# Patient Record
Sex: FEMALE | Race: WHITE | NOT HISPANIC OR LATINO | Employment: OTHER | URBAN - METROPOLITAN AREA
[De-identification: names, ages, dates, MRNs, and addresses within clinical notes are randomized per-mention and may not be internally consistent; named-entity substitution may affect disease eponyms.]

---

## 2017-01-05 ENCOUNTER — HOSPITAL ENCOUNTER (OUTPATIENT)
Dept: RADIOLOGY | Facility: HOSPITAL | Age: 57
Discharge: HOME | End: 2017-01-05
Attending: ORTHOPAEDIC SURGERY
Payer: MEDICARE

## 2017-01-05 ENCOUNTER — TELEPHONE (OUTPATIENT)
Dept: FAMILY MEDICINE | Facility: CLINIC | Age: 57
End: 2017-01-05

## 2017-01-05 ENCOUNTER — OFFICE VISIT (OUTPATIENT)
Dept: FAMILY MEDICINE | Facility: CLINIC | Age: 57
End: 2017-01-05
Payer: MEDICARE

## 2017-01-05 ENCOUNTER — OFFICE VISIT (OUTPATIENT)
Dept: ORTHOPEDICS | Facility: CLINIC | Age: 57
End: 2017-01-05
Payer: MEDICARE

## 2017-01-05 VITALS — HEIGHT: 66 IN | BODY MASS INDEX: 47.09 KG/M2 | WEIGHT: 293 LBS

## 2017-01-05 VITALS
SYSTOLIC BLOOD PRESSURE: 148 MMHG | DIASTOLIC BLOOD PRESSURE: 94 MMHG | HEART RATE: 48 BPM | BODY MASS INDEX: 50.04 KG/M2 | HEIGHT: 66 IN

## 2017-01-05 DIAGNOSIS — M19.90 ARTHRITIS: ICD-10-CM

## 2017-01-05 DIAGNOSIS — K63.5 COLON POLYP: ICD-10-CM

## 2017-01-05 DIAGNOSIS — I10 ESSENTIAL HYPERTENSION: ICD-10-CM

## 2017-01-05 DIAGNOSIS — M25.552 LEFT HIP PAIN: ICD-10-CM

## 2017-01-05 DIAGNOSIS — I48.20 CHRONIC ATRIAL FIBRILLATION: Primary | ICD-10-CM

## 2017-01-05 DIAGNOSIS — M25.552 LEFT HIP PAIN: Primary | ICD-10-CM

## 2017-01-05 PROCEDURE — 99203 OFFICE O/P NEW LOW 30 MIN: CPT | Mod: S$GLB,,, | Performed by: ORTHOPAEDIC SURGERY

## 2017-01-05 PROCEDURE — 3077F SYST BP >= 140 MM HG: CPT | Mod: S$GLB,,, | Performed by: FAMILY MEDICINE

## 2017-01-05 PROCEDURE — 99499 UNLISTED E&M SERVICE: CPT | Mod: S$GLB,,, | Performed by: ORTHOPAEDIC SURGERY

## 2017-01-05 PROCEDURE — 99999 PR PBB SHADOW E&M-EST. PATIENT-LVL II: CPT | Mod: PBBFAC,,, | Performed by: ORTHOPAEDIC SURGERY

## 2017-01-05 PROCEDURE — 1159F MED LIST DOCD IN RCRD: CPT | Mod: S$GLB,,, | Performed by: FAMILY MEDICINE

## 2017-01-05 PROCEDURE — 3080F DIAST BP >= 90 MM HG: CPT | Mod: S$GLB,,, | Performed by: FAMILY MEDICINE

## 2017-01-05 PROCEDURE — 73502 X-RAY EXAM HIP UNI 2-3 VIEWS: CPT | Mod: 26,LT,, | Performed by: RADIOLOGY

## 2017-01-05 PROCEDURE — 99204 OFFICE O/P NEW MOD 45 MIN: CPT | Mod: S$GLB,,, | Performed by: FAMILY MEDICINE

## 2017-01-05 PROCEDURE — 1159F MED LIST DOCD IN RCRD: CPT | Mod: S$GLB,,, | Performed by: ORTHOPAEDIC SURGERY

## 2017-01-05 PROCEDURE — 99999 PR PBB SHADOW E&M-NEW PATIENT-LVL IV: CPT | Mod: PBBFAC,,, | Performed by: FAMILY MEDICINE

## 2017-01-05 RX ORDER — ACETAMINOPHEN 325 MG/1
650 TABLET ORAL DAILY PRN
COMMUNITY

## 2017-01-05 RX ORDER — FERROUS SULFATE 325(65) MG
325 TABLET ORAL DAILY
COMMUNITY

## 2017-01-05 RX ORDER — RABEPRAZOLE SODIUM 20 MG/1
TABLET, DELAYED RELEASE ORAL
COMMUNITY
Start: 2016-10-03 | End: 2017-01-05 | Stop reason: SDUPTHER

## 2017-01-05 RX ORDER — CITALOPRAM 40 MG/1
40 TABLET, FILM COATED ORAL NIGHTLY
COMMUNITY

## 2017-01-05 RX ORDER — TRAMADOL HYDROCHLORIDE 50 MG/1
50 TABLET ORAL
COMMUNITY
Start: 2016-07-01 | End: 2017-01-05

## 2017-01-05 RX ORDER — CELECOXIB 400 MG/1
400 CAPSULE ORAL DAILY
Status: ON HOLD | COMMUNITY
End: 2018-04-14 | Stop reason: HOSPADM

## 2017-01-05 RX ORDER — RABEPRAZOLE SODIUM 20 MG/1
20 TABLET, DELAYED RELEASE ORAL DAILY
Qty: 90 TABLET | Refills: 2 | Status: SHIPPED | OUTPATIENT
Start: 2017-01-05

## 2017-01-05 RX ORDER — METOPROLOL TARTRATE 25 MG/1
12.5 TABLET, FILM COATED ORAL 2 TIMES DAILY
COMMUNITY
Start: 2016-10-03

## 2017-01-05 RX ORDER — OMEPRAZOLE 20 MG/1
20 CAPSULE, DELAYED RELEASE ORAL DAILY PRN
Status: ON HOLD | COMMUNITY
End: 2018-04-12

## 2017-01-05 RX ORDER — LISINOPRIL 20 MG/1
TABLET ORAL
COMMUNITY
Start: 2016-07-21

## 2017-01-05 RX ORDER — PROPAFENONE HYDROCHLORIDE 150 MG/1
300 TABLET, COATED ORAL 2 TIMES DAILY
COMMUNITY

## 2017-01-05 NOTE — PROGRESS NOTES
Trice Damon, 56 years old, has had debilitating left-sided hip pain for   couple years' time, got progressively worse, comes in today in a wheelchair,   8/10 on the pain scale, aching pain, worse with activity and relieved with rest.    Points to the groin as the location of her pain.  She also has pain in the   lower back radiating down the leg.  No precipitating event.    Exam today shows that hip range of motion is exquisitely uncomfortable for and   reproduces symptoms.  She also has positive straight leg raise testing.    X-rays of the hip show degenerative changes in the hip, degenerative changes   noted in the lumbar spine as well.    Degenerative disease in the hip, degenerative disease in the lumbar spine.    PLAN:  We will schedule for a diagnostic/therapeutic injection into the left hip   intraarticularly with the assistance of fluoroscopy.  We discussed with her hip   replacement surgery.  We will give her literature on this.  We will see her   back as needed.      JAZMIN/BERE  dd: 01/05/2017 14:53:33 (CST)  td: 01/06/2017 07:52:07 (CST)  Doc ID   #[id number]  Job ID #[id number]    CC:     Further History  Aching pain  Worse with activity  Relieved with rest  No other associated symptoms  No other radiation    Further Exam  Alert and oriented  Pleasant  Contralateral limb has appropriate range of motion for age and condition  Contralateral limb has appropriate strength for age and condition  Contralateral limb has appropriate stability  for age and condition  No adenopathy  Pulses are appropriate for current condition  Skin is intact        Chief Complaint    Chief Complaint   Patient presents with    Left Hip - Pain       HPI  Trice Damon is a 56 y.o.  female who presents with       Past Medical History  Past Medical History   Diagnosis Date    A-fib        Past Surgical History  Past Surgical History   Procedure Laterality Date    Gastric bypass         Medications  Current Outpatient Prescriptions    Medication Sig    lisinopril 10 MG tablet TAKE ONE TABLET BY MOUTH ONCE DAILY    metoprolol tartrate (LOPRESSOR) 25 MG tablet     acetaminophen (TYLENOL) 325 MG tablet Take 650 mg by mouth.    celecoxib (CELEBREX) 400 MG capsule Take 400 mg by mouth.    citalopram (CELEXA) 40 MG tablet Take 40 mg by mouth.    ferrous sulfate 325 mg (65 mg iron) Tab tablet Take 325 mg by mouth.    omeprazole (PRILOSEC) 20 MG capsule Take 20 mg by mouth.    propafenone (RHTHYMOL) 150 MG Tab Take 150 mg by mouth.    rabeprazole (ACIPHEX) 20 mg tablet Take 1 tablet (20 mg total) by mouth once daily.     No current facility-administered medications for this visit.        Allergies  Review of patient's allergies indicates:   Allergen Reactions    Codeine sulfate      Itchy (skin)^       Family History  History reviewed. No pertinent family history.    Social History  Social History     Social History    Marital status:      Spouse name: N/A    Number of children: N/A    Years of education: N/A     Occupational History    Not on file.     Social History Main Topics    Smoking status: Never Smoker    Smokeless tobacco: Not on file    Alcohol use Not on file    Drug use: Not on file    Sexual activity: Not on file     Other Topics Concern    Not on file     Social History Narrative               Review of Systems     Constitutional: Negative    HENT: Negative  Eyes: Negative  Respiratory: Negative  Cardiovascular: Negative  Musculoskeletal: HPI  Skin: Negative  Neurological: Negative  Hematological: Negative  Endocrine: Negative                 Physical Exam    There were no vitals filed for this visit.  Body mass index is 50.04 kg/(m^2).  Physical Examination:     General appearance -  well appearing, and in no distress  Mental status - awake  Neck - supple  Chest -  symmetric air entry  Heart - normal rate   Abdomen - soft      Assessment     1. Left hip pain          Plan

## 2017-01-05 NOTE — MR AVS SNAPSHOT
[facility]  [address]  [place] [address]  Phone: [phone]  Fax: [phone]                  Trice Damon   2017 3:00 PM   Office Visit    Description:  Female : 1960   Provider:  Saul Jackson MD   Department:  [facility]           Reason for Visit     Establish Care           Diagnoses this Visit        Comments    Chronic atrial fibrillation    -  Primary     Colon polyp         Arthritis                To Do List           Goals (5 Years of Data)     None       These Medications        Disp Refills Start End    rabeprazole (ACIPHEX) 20 mg tablet 90 tablet 2 2017     Take 1 tablet (20 mg total) by mouth once daily. - Oral    [address] [address]  Ph #: [phone]         [facility] On Call     [facility] On Call Nurse Care Line -  Assistance  Registered nurses in the [facility] On Call Center provide clinical advisement, health education, appointment booking, and other advisory services.  Call for this free service at [phone].             Medications           Message regarding Medications     Verify the changes and/or additions to your medication regime listed below are the same as discussed with your clinician today.  If any of these changes or additions are incorrect, please notify your healthcare provider.        CHANGE how you are taking these medications     Start Taking Instead of    rabeprazole (ACIPHEX) 20 mg tablet rabeprazole (ACIPHEX) 20 mg tablet    Dosage:  Take 1 tablet (20 mg total) by mouth once daily.     Reason for Change:  Reorder       STOP taking these medications     tramadol (ULTRAM) 50 mg tablet Take 50 mg by mouth.           Verify that the below list of medications is an accurate representation of the medications you are currently taking.  If none reported, the list may be blank. If incorrect, please contact your healthcare provider.  "Carry this list with you in case of emergency.           Current Medications     acetaminophen (TYLENOL) 325 MG tablet Take 650 mg by mouth.    celecoxib (CELEBREX) 400 MG capsule Take 400 mg by mouth.    citalopram (CELEXA) 40 MG tablet Take 40 mg by mouth.    ferrous sulfate 325 mg (65 mg iron) Tab tablet Take 325 mg by mouth.    lisinopril 10 MG tablet TAKE ONE TABLET BY MOUTH ONCE DAILY    metoprolol tartrate (LOPRESSOR) 25 MG tablet     omeprazole (PRILOSEC) 20 MG capsule Take 20 mg by mouth.    propafenone (RHTHYMOL) 150 MG Tab Take 150 mg by mouth.    rabeprazole (ACIPHEX) 20 mg tablet Take 1 tablet (20 mg total) by mouth once daily.           Clinical Reference Information           Vital Signs - Last Recorded  Most recent update: 1/5/2017  3:34 PM by Camila Freire LPN    BP Pulse Ht BMI       (!) 148/94 (!) 48 5' 6" (1.676 m) 50.04 kg/m2       Blood Pressure          Most Recent Value    BP  (!)  148/94      Allergies as of 1/5/2017     Codeine Sulfate      Immunizations Administered on Date of Encounter - 1/5/2017     None      Orders Placed During Today's Visit      Normal Orders This Visit    Ambulatory referral to Cardiology     Ambulatory referral to Gastroenterology     Ambulatory referral to Rheumatology       [facility] Sign-Up     Activating your [facility] account is as easy as 1-2-3!     1) Visit [email or web address], select Sign Up Now, enter this activation code and your date of birth, then select Next.  [email or web address]  Expires: 1/13/2017 12:35 PM      2) Create a username and password to use when you visit [facility] in the future and select a security question in case you lose your password and select Next.    3) Enter your e-mail address and click Sign Up!    Additional Information  If you have questions, please e-mail [email or web address] or call [phone] to talk to our [facility] staff. Remember, [facility] is NOT to be used for urgent needs. For medical emergencies, dial 911.         "

## 2017-01-05 NOTE — TELEPHONE ENCOUNTER
Dr. Scott's first available is in March. Dr. Haile is June. Pt is scheduled for 3/14/17 with Dr. Scott and placed on wait list. Thank you for the referral. Pt notified.

## 2017-01-05 NOTE — PROGRESS NOTES
Patient, Trice Damon (MRN #[id number]), presented with a recorded BMI of 50.04 kg/m^2 consistent with the definition of morbid obesity (ICD-10 E66.01). The patient's morbid obesity was monitored, evaluated, addressed and/or treated. This addendum to the medical record is made on 01/05/2017.

## 2017-01-09 ENCOUNTER — TELEPHONE (OUTPATIENT)
Dept: ORTHOPEDICS | Facility: CLINIC | Age: 57
End: 2017-01-09

## 2017-01-09 NOTE — TELEPHONE ENCOUNTER
----- Message from Tamra Hyatt sent at 1/9/2017 10:21 AM CST -----  Contact: Self  Calling regarding having physical therapy order faxed to [facility]. They did not go through last week.  Please re-fax.

## 2017-01-15 NOTE — PROGRESS NOTES
HISTORY OF PRESENT ILLNESS:  A pleasant female here today.  She is a new patient   to me.  [place] has [place].  She has got a   long history of osteoarthritis of the left hip, sees Dr. Herring in Orthopedics.    She has recently been having a low pulse and we discussed decreasing her   Lopressor to a half of a tablet twice daily, which would give her 12.5 mg twice   a day.  Otherwise, doing well.  She would like to see Rheumatology,   Gastroenterology, and Cardiology for various issues in her medical history.    SOCIAL HISTORY:  She is a nonsmoker.    PAST SURGICAL HISTORY:  She has had gastric bypass.    PAST MEDICAL HISTORY:  She has got a history of atrial fib and osteoarthritis.    MEDICATIONS:  Reviewed.    PHYSICAL EXAMINATION:  VITAL SIGNS:  Blood pressure slightly elevated.  Pulse 48.  Good capillary   refill.  BMI 50.  HEENT:  Ear canals, nasal passage, and oropharynx unremarkable.  NECK:  No neck masses or thyromegaly.  HEART:  No carotid bruits, S3 or murmur.  SKIN:  No acute skin rash.  EXTREMITIES:  She did have pain with range of motion of the left hip.    ASSESSMENT:  Chronic atrial fibrillation, arthritis, history of colon polyps.    She has hypertension.    PLAN:  The plan will be for decrease Lopressor to half tablet twice daily.    Continue follow up with Orthopedics.  We will set her up with Rheumatology,   Gastroenterology, and Cardiology at her request.  We will see her back in   followup as scheduled.      JUICE/BERE  dd: 01/15/2017 13:21:42 (CST)  td: 01/15/2017 18:23:56 (CST)  Doc ID   #[id number]  Job ID #[id number]    CC:

## 2017-04-04 ENCOUNTER — TELEPHONE (OUTPATIENT)
Dept: FAMILY MEDICINE | Facility: CLINIC | Age: 57
End: 2017-04-04

## 2017-04-04 NOTE — TELEPHONE ENCOUNTER
----- Message from Lewis Byrnes sent at 4/4/2017  9:45 AM CDT -----  Contact: [facility],Bridgette anaya wants to speak with a nurse regarding scheduling appointment before April 17 please call back at [phone]

## 2017-09-29 DIAGNOSIS — Z12.11 COLON CANCER SCREENING: ICD-10-CM

## 2018-04-12 PROBLEM — R42 DIZZINESS: Status: ACTIVE | Noted: 2018-04-12

## 2018-04-12 PROBLEM — E03.9 HYPOTHYROIDISM: Status: ACTIVE | Noted: 2018-04-12

## 2018-04-12 PROBLEM — I48.91 A-FIB: Status: ACTIVE | Noted: 2018-04-12

## 2018-04-12 PROBLEM — L08.9 SOFT TISSUE INFECTION: Status: ACTIVE | Noted: 2018-04-12

## 2018-04-12 PROBLEM — L02.416 ABSCESS OF HIP, LEFT: Status: ACTIVE | Noted: 2018-04-12

## 2018-04-12 PROBLEM — M86.9 OSTEOMYELITIS HIP: Status: ACTIVE | Noted: 2018-04-12

## 2018-04-12 PROBLEM — I10 HTN (HYPERTENSION): Status: ACTIVE | Noted: 2018-04-12

## 2018-04-12 PROBLEM — R06.00 DYSPNEA: Status: ACTIVE | Noted: 2018-04-12

## 2018-04-13 PROBLEM — E66.9 OBESITY: Status: ACTIVE | Noted: 2018-04-13

## 2018-05-25 ENCOUNTER — TELEPHONE (OUTPATIENT)
Dept: ADMINISTRATIVE | Facility: CLINIC | Age: 58
End: 2018-05-25

## 2018-05-25 NOTE — PROGRESS NOTES
Home Health SOC with [facility] per Dr. Jackson.SN services provided.

## 2019-11-13 LAB — HEMOCCULT STL QL IA: NEGATIVE

## 2019-12-10 ENCOUNTER — PATIENT OUTREACH (OUTPATIENT)
Dept: ADMINISTRATIVE | Facility: HOSPITAL | Age: 59
End: 2019-12-10

## 2019-12-26 ENCOUNTER — TELEPHONE (OUTPATIENT)
Dept: RHEUMATOLOGY | Facility: CLINIC | Age: 59
End: 2019-12-26

## 2019-12-26 NOTE — TELEPHONE ENCOUNTER
----- Message from Tamra Teran sent at 12/26/2019  4:11 PM CST -----  Contact: Patient  Type:  Patient Returning Call    Who Called:  Patient  Who Left Message for Patient:  Not sure, office  Does the patient know what this is regarding?:  Maybe appt?  Best Call Back Number:   [phone]  Additional Information:

## 2019-12-30 ENCOUNTER — TELEPHONE (OUTPATIENT)
Dept: RHEUMATOLOGY | Facility: CLINIC | Age: 59
End: 2019-12-30

## 2019-12-30 NOTE — TELEPHONE ENCOUNTER
----- Message from Estela Littlejohn sent at 12/30/2019  4:44 PM CST -----  Contact: patient  Type:  Patient Returning Call    Who Called:  patient  Who Left Message for Patient:  Elissa  Does the patient know what this is regarding?:    Best Call Back Number:  [phone]  Additional Information:

## 2019-12-31 ENCOUNTER — TELEPHONE (OUTPATIENT)
Dept: RHEUMATOLOGY | Facility: CLINIC | Age: 59
End: 2019-12-31

## 2020-03-24 ENCOUNTER — NURSE TRIAGE (OUTPATIENT)
Dept: ADMINISTRATIVE | Facility: CLINIC | Age: 60
End: 2020-03-24

## 2020-03-24 ENCOUNTER — OFFICE VISIT (OUTPATIENT)
Dept: URGENT CARE | Facility: CLINIC | Age: 60
End: 2020-03-24
Payer: MEDICARE

## 2020-03-24 VITALS — HEART RATE: 55 BPM | HEIGHT: 66 IN | OXYGEN SATURATION: 99 % | BODY MASS INDEX: 47.09 KG/M2 | WEIGHT: 293 LBS

## 2020-03-24 DIAGNOSIS — J20.9 ACUTE BRONCHITIS, UNSPECIFIED ORGANISM: ICD-10-CM

## 2020-03-24 DIAGNOSIS — R61 NIGHT SWEATS: ICD-10-CM

## 2020-03-24 DIAGNOSIS — R06.2 WHEEZING: Primary | ICD-10-CM

## 2020-03-24 DIAGNOSIS — J40 BRONCHITIS: ICD-10-CM

## 2020-03-24 LAB
CTP QC/QA: YES
FLUAV AG NPH QL: NEGATIVE
FLUBV AG NPH QL: NEGATIVE

## 2020-03-24 PROCEDURE — 99214 OFFICE O/P EST MOD 30 MIN: CPT | Mod: 25,S$GLB,, | Performed by: NURSE PRACTITIONER

## 2020-03-24 PROCEDURE — 87804 POCT INFLUENZA A/B: ICD-10-PCS | Mod: QW,S$GLB,, | Performed by: NURSE PRACTITIONER

## 2020-03-24 PROCEDURE — 87804 INFLUENZA ASSAY W/OPTIC: CPT | Mod: QW,S$GLB,, | Performed by: NURSE PRACTITIONER

## 2020-03-24 PROCEDURE — U0002 COVID-19 LAB TEST NON-CDC: HCPCS | Mod: HCNC

## 2020-03-24 PROCEDURE — 99214 PR OFFICE/OUTPT VISIT, EST, LEVL IV, 30-39 MIN: ICD-10-PCS | Mod: 25,S$GLB,, | Performed by: NURSE PRACTITIONER

## 2020-03-24 NOTE — PATIENT INSTRUCTIONS
If trouble breathing, go to the ER   Continue doxycyline  Start albuterol every 4-6 hours for wheezing/short of breath.      Self quarantine until test results back and follow cdc guidelines    Instructions for Patients Awaiting COVID-19 Test Results    Test results should be available within 7 days.    You can contact your care team via the patient portal or the regular phone number to check on testing status.    Your results will be made available to your provider and to our Infection Control Team. As soon as results are available, we will contact you. We will not be releasing results to the portal until your provider reviews them.     Please continue infection control precautions like covering your mouth when coughing, washing hands frequently and minimizing contact with others whenever possible. Current CDC recommendations do not require quarantine if you are feeling OK and haven't had fever in 24 hours. However, we recommend that you stay home while you are awaiting test results, if possible. Consider wearing a mask if you need to go out in public, until result is known.       Bronchitis with Wheezing (Viral or Bacterial: Adult)    Bronchitis is an infection of the air passages. It often occurs during a cold and is usually caused by a virus. Symptoms include cough with mucus (phlegm) and low-grade fever. This illness is contagious during the first few days and is spread through the air by coughing and sneezing, or by direct contact (touching the sick person and then touching your own eyes, nose, or mouth).  If there is a lot of inflammation, air flow is restricted. The air passages may also go into spasm, especially if you have asthma. This causes wheezing and difficulty breathing even in people who do not have asthma.  Bronchitis usually lasts 7 to 14 days. The wheezing should improve with treatment during the first week. An inhaler is often prescribed to relax the air passages and stop wheezing. Antibiotics  will be prescribed if your doctor thinks there is also a secondary bacterial infection.  Home care  · If symptoms are severe, rest at home for the first 2 to 3 days. When you go back to your usual activities, don't let yourself get too tired.  · Do not smoke. Also avoid being exposed to secondhand smoke.  · You may use over-the-counter medicine to control fever or pain, unless another medicine was prescribed. Note: If you have chronic liver or kidney disease or have ever had a stomach ulcer or gastrointestinal bleeding, talk with your healthcare provider before using these medicines. Also talk to your provider if you are taking medicine to prevent blood clots.) Aspirin should never be given to anyone younger than 18 years of age who is ill with a viral infection or fever. It may cause severe liver or brain damage.  · Your appetite may be poor, so a light diet is fine. Avoid dehydration by drinking 6 to 8 glasses of fluids per day (such as water, soft drinks, sports drinks, juices, tea, or soup). Extra fluids will help loosen secretions in the nose and lungs.  · Over-the-counter cough, cold, and sore-throat medicines will not shorten the length of the illness, but they may be helpful to reduce symptoms. (Note: Do not use decongestants if you have high blood pressure.)  · If you were given an inhaler, use it exactly as directed. If you need to use it more often than prescribed, your condition may be worsening. If this happens, contact your healthcare provider.  · If prescribed, finish all antibiotic medicine, even if you are feeling better after only a few days.  Follow-up care  Follow up with your healthcare provider, or as advised. If you had an X-ray or ECG (electrocardiogram), a specialist will review it. You will be notified of any new findings that may affect your care.  Note: If you are age 65 or older, or if you have a chronic lung disease or condition that affects your immune system, or you smoke, talk to  your healthcare provider about having a pneumococcal vaccinations and a yearly influenza vaccination (flu shot).  When to seek medical advice  Call your healthcare provider right away if any of these occur:  · Fever of 100.4°F (38°C) or higher  · Coughing up increasing amounts of colored sputum  · Weakness, drowsiness, headache, facial pain, ear pain, or a stiff neck  Call 911, or get immediate medical care  Contact emergency services right away if any of these occur.  · Coughing up blood  · Worsening weakness, drowsiness, headache, or stiff neck  · Increased wheezing not helped with medication, shortness of breath, or pain with breathing  Date Last Reviewed: 9/13/2015  © 9114-2840 [employer]. [address] [place] [address], [place] [address]. All rights reserved. This information is not intended as a substitute for professional medical care. Always follow your healthcare professional's instructions.

## 2020-03-24 NOTE — PROGRESS NOTES
"Subjective:       Patient ID: Trice Damon is a 60 y.o. female.    Vitals:  height is 5' 6" (1.676 m) and weight is 136.1 kg (300 lb). Her pulse is 55 (abnormal). Her oxygen saturation is 99%.     Chief Complaint: Wheezing and Cough    smh abif, htn, dyspnea/hx of pneumonia- last hospitalization was approx 20 years ago for pneumonia. Meds: lisinopril, lopressor, xarelta, hctz., allopurinol. No known exposure to covid 19. Not a healthcare worker.  Reports cough/wheezing, fever around 101F for a 2-3 weeks- started vasile gras night- feb 25 (one month)- started on doxycycline - currently on day 8/14. Dr. tillman (cardiologist) . Currently sweats/chills at night, sob, chest tightnes, subjective low . rx albuterol per dr. tillman- patient has not started yet.       Constitution: Positive for sweating, fatigue, fever and generalized weakness. Negative for chills.   HENT: Positive for congestion, sinus pain, sinus pressure and sore throat. Negative for voice change.    Neck: Positive for neck pain and neck stiffness. Negative for painful lymph nodes.   Eyes: Positive for eye discharge. Negative for eye redness.   Respiratory: Positive for chest tightness, cough, sputum production, shortness of breath and wheezing. Negative for bloody sputum, COPD, stridor and asthma.    Gastrointestinal: Positive for nausea and vomiting.   Musculoskeletal: Positive for muscle ache.   Skin: Negative for rash.   Allergic/Immunologic: Positive for seasonal allergies. Negative for asthma.   Hematologic/Lymphatic: Negative for swollen lymph nodes.       Objective:      Physical Exam    Limited physical exam using tytocare due to COVID emergency  Appears sick, cough with deep breath/speaking.   Assessment:       1. Wheezing    2. Bronchitis    3. Night sweats        Plan:     reviewed poct flu testing  ER precautions given  Self quarantine and follow cdc guidelines .    referred for covid 19 testing per patient's cardiologist, Dr. Tillman- per " telephone encounter notes on 3/24/2020.   Hx of bronchitis/pneumonia, afib, cough/sob, subjective fever.    Wheezing  -     POCT Influenza A/B    Bronchitis  -     POCT Influenza A/B    Night sweats  -     POCT Influenza A/B      Patient Instructions   If trouble breathing, go to the ER   Continue doxycyline  Start albuterol every 4-6 hours for wheezing/short of breath.      Self quarantine until test results back and follow cdc guidelines    Instructions for Patients Awaiting COVID-19 Test Results    Test results should be available within 7 days.    You can contact your care team via the patient portal or the regular phone number to check on testing status.    Your results will be made available to your provider and to our Infection Control Team. As soon as results are available, we will contact you. We will not be releasing results to the portal until your provider reviews them.     Please continue infection control precautions like covering your mouth when coughing, washing hands frequently and minimizing contact with others whenever possible. Current CDC recommendations do not require quarantine if you are feeling OK and haven't had fever in 24 hours. However, we recommend that you stay home while you are awaiting test results, if possible. Consider wearing a mask if you need to go out in public, until result is known.       Bronchitis with Wheezing (Viral or Bacterial: Adult)    Bronchitis is an infection of the air passages. It often occurs during a cold and is usually caused by a virus. Symptoms include cough with mucus (phlegm) and low-grade fever. This illness is contagious during the first few days and is spread through the air by coughing and sneezing, or by direct contact (touching the sick person and then touching your own eyes, nose, or mouth).  If there is a lot of inflammation, air flow is restricted. The air passages may also go into spasm, especially if you have asthma. This causes wheezing and  difficulty breathing even in people who do not have asthma.  Bronchitis usually lasts 7 to 14 days. The wheezing should improve with treatment during the first week. An inhaler is often prescribed to relax the air passages and stop wheezing. Antibiotics will be prescribed if your doctor thinks there is also a secondary bacterial infection.  Home care  · If symptoms are severe, rest at home for the first 2 to 3 days. When you go back to your usual activities, don't let yourself get too tired.  · Do not smoke. Also avoid being exposed to secondhand smoke.  · You may use over-the-counter medicine to control fever or pain, unless another medicine was prescribed. Note: If you have chronic liver or kidney disease or have ever had a stomach ulcer or gastrointestinal bleeding, talk with your healthcare provider before using these medicines. Also talk to your provider if you are taking medicine to prevent blood clots.) Aspirin should never be given to anyone younger than 18 years of age who is ill with a viral infection or fever. It may cause severe liver or brain damage.  · Your appetite may be poor, so a light diet is fine. Avoid dehydration by drinking 6 to 8 glasses of fluids per day (such as water, soft drinks, sports drinks, juices, tea, or soup). Extra fluids will help loosen secretions in the nose and lungs.  · Over-the-counter cough, cold, and sore-throat medicines will not shorten the length of the illness, but they may be helpful to reduce symptoms. (Note: Do not use decongestants if you have high blood pressure.)  · If you were given an inhaler, use it exactly as directed. If you need to use it more often than prescribed, your condition may be worsening. If this happens, contact your healthcare provider.  · If prescribed, finish all antibiotic medicine, even if you are feeling better after only a few days.  Follow-up care  Follow up with your healthcare provider, or as advised. If you had an X-ray or ECG  (electrocardiogram), a specialist will review it. You will be notified of any new findings that may affect your care.  Note: If you are age 65 or older, or if you have a chronic lung disease or condition that affects your immune system, or you smoke, talk to your healthcare provider about having a pneumococcal vaccinations and a yearly influenza vaccination (flu shot).  When to seek medical advice  Call your healthcare provider right away if any of these occur:  · Fever of 100.4°F (38°C) or higher  · Coughing up increasing amounts of colored sputum  · Weakness, drowsiness, headache, facial pain, ear pain, or a stiff neck  Call 911, or get immediate medical care  Contact emergency services right away if any of these occur.  · Coughing up blood  · Worsening weakness, drowsiness, headache, or stiff neck  · Increased wheezing not helped with medication, shortness of breath, or pain with breathing  Date Last Reviewed: 9/13/2015 © 2000-2017 [employer]. [address] [place] [address], [place] [address]. All rights reserved. This information is not intended as a substitute for professional medical care. Always follow your healthcare professional's instructions.

## 2020-03-24 NOTE — TELEPHONE ENCOUNTER
Disposition states patient is to be seen in 3 days--due to wheenzing and SOB-Patient going to Urgent Care.   Patient states she has been sick since Mardi Gras with coughing, weakness and initially fever. Is currently on albuterol and doxycycline. Has been afebrile. States she woke up wheezing this am and is very weak. States she becomes SOB on exertion. No h/o asthma. States she spoke with her cardiologist this morning and he recommended being tested for corona virus. Patient states she will go to [facility] in [place]. Tried x2 to call report and left message.   Reason for Disposition   Cough has been present for > 3 weeks    Additional Information   Negative: Severe difficulty breathing (e.g., struggling for each breath, speak in single words, bluish lips)   Negative: Sounds like a life-threatening emergency to the triager   Negative: Bluish (or gray) lips or face   Negative: Severe difficulty breathing (e.g., struggling for each breath, speaks in single words)   Negative: Rapid onset of cough and has hives   Negative: Coughing started suddenly after medicine, an allergic food or bee sting   Negative: Difficulty breathing after exposure to flames, smoke, or fumes   Negative: Sounds like a life-threatening emergency to the triager   Negative: Previous asthma attacks and this feels like asthma attack   Negative: Chest pain present when not coughing   Negative: Patient sounds very sick or weak to the triager   Negative: Passed out (i.e., fainted, collapsed and was not responding)   Negative: Difficulty breathing   Negative: Coughed up > 1 tablespoon (15 ml) blood (Exception: blood-tinged sputum)   Negative: Fever > 103 F (39.4 C)   Negative: Fever > 101 F (38.3 C) and over 60 years of age   Negative: Fever > 100.0 F (37.8 C) and has diabetes mellitus or a weak immune system (e.g., HIV positive, cancer chemotherapy, organ transplant, splenectomy, chronic steroids)   Negative: Fever >  100.0 F (37.8 C) and bedridden (e.g., nursing home patient, stroke, chronic illness, recovering from surgery)   Negative: Increasing ankle swelling   Negative: Wheezing is present    Protocols used: COUGH-A-OH, CORONAVIRUS (COVID-19) EXPOSURE-A-OH

## 2020-03-25 LAB — SARS-COV-2 RNA RESP QL NAA+PROBE: NOT DETECTED

## 2020-03-26 ENCOUNTER — TELEPHONE (OUTPATIENT)
Dept: URGENT CARE | Facility: CLINIC | Age: 60
End: 2020-03-26

## 2020-03-27 NOTE — TELEPHONE ENCOUNTER
Patient states she has been sick since Mardi Gras Night.  Reports she has gotten better but symptoms have not cleared.  Patient felt that she had pneumonia because the cough continues.   States she feels like she needs a further workup.  Reports her cardiologist wondered about whether she should be evaluated, he prescribed an inhaler and multiple prescriptions.  Patient reports she will finish Doxycycline in 3 days.      States she will follow up with her cardiologist in the morning about her continued symptoms that have not cleared up.  Reports she is not having any CP or SOB.  However, advised if she has any change or worsening of symptoms, she should go to the nearest ER.     Was informed that her test was NEGATIVE for COVID-19 (coronavirus).  If you still have symptoms, treat with rest, fluids, and over-the-counter medications.  Continue to stay home, avoid large crowds, and practice proper handwashing.     If your symptoms worsen or if you have any other concerns, please contact [facility] On Call at [phone].     Patient verbalizes understanding and agrees with plan of care.    Sincerely,    Bhavin Theodore NP

## 2020-11-12 PROBLEM — N95.0 POST-MENOPAUSE BLEEDING: Status: ACTIVE | Noted: 2020-11-12

## 2021-12-02 ENCOUNTER — PATIENT OUTREACH (OUTPATIENT)
Dept: ADMINISTRATIVE | Facility: HOSPITAL | Age: 61
End: 2021-12-02
Payer: MEDICARE

## 2022-08-09 ENCOUNTER — TELEPHONE (OUTPATIENT)
Dept: RHEUMATOLOGY | Facility: CLINIC | Age: 62
End: 2022-08-09
Payer: MEDICARE

## 2022-08-09 NOTE — TELEPHONE ENCOUNTER
LVM offering 5-3-23 with Dr. Scott. Referral from Dr. Decker received. Call back number is provided. CG

## 2022-08-19 ENCOUNTER — TELEPHONE (OUTPATIENT)
Dept: RHEUMATOLOGY | Facility: CLINIC | Age: 62
End: 2022-08-19
Payer: MEDICARE

## 2022-08-19 NOTE — TELEPHONE ENCOUNTER
----- Message from Jovani Sylvester sent at 8/19/2022 10:55 AM CDT -----  Type:  Patient Returning Call    Who Called:  pt  Who Left Message for Patient:  unknown  Does the patient know what this is regarding?:  yes  Best Call Back Number:  [phone] (home)     Additional Information:  please advise--thank you

## 2022-08-19 NOTE — TELEPHONE ENCOUNTER
Patient returning call, informed pt Dr. Scott and Re out today. Pt states she was offered np appt. Left note for Dr. Scott nurse to call back when back in clinic.

## 2022-09-20 ENCOUNTER — TELEPHONE (OUTPATIENT)
Dept: FAMILY MEDICINE | Facility: CLINIC | Age: 62
End: 2022-09-20
Payer: MEDICARE

## 2023-05-18 ENCOUNTER — OFFICE VISIT (OUTPATIENT)
Dept: RHEUMATOLOGY | Facility: CLINIC | Age: 63
End: 2023-05-18
Payer: MEDICARE

## 2023-05-18 VITALS
DIASTOLIC BLOOD PRESSURE: 70 MMHG | HEART RATE: 50 BPM | HEIGHT: 66 IN | SYSTOLIC BLOOD PRESSURE: 112 MMHG | BODY MASS INDEX: 47.09 KG/M2 | WEIGHT: 293 LBS

## 2023-05-18 DIAGNOSIS — M15.9 PRIMARY OSTEOARTHRITIS INVOLVING MULTIPLE JOINTS: Primary | ICD-10-CM

## 2023-05-18 PROCEDURE — 3008F PR BODY MASS INDEX (BMI) DOCUMENTED: ICD-10-PCS | Mod: CPTII,S$GLB,, | Performed by: INTERNAL MEDICINE

## 2023-05-18 PROCEDURE — 4010F PR ACE/ARB THEARPY RXD/TAKEN: ICD-10-PCS | Mod: CPTII,S$GLB,, | Performed by: INTERNAL MEDICINE

## 2023-05-18 PROCEDURE — 1159F MED LIST DOCD IN RCRD: CPT | Mod: CPTII,S$GLB,, | Performed by: INTERNAL MEDICINE

## 2023-05-18 PROCEDURE — 99999 PR PBB SHADOW E&M-EST. PATIENT-LVL IV: ICD-10-PCS | Mod: PBBFAC,,, | Performed by: INTERNAL MEDICINE

## 2023-05-18 PROCEDURE — 3008F BODY MASS INDEX DOCD: CPT | Mod: CPTII,S$GLB,, | Performed by: INTERNAL MEDICINE

## 2023-05-18 PROCEDURE — 99999 PR PBB SHADOW E&M-EST. PATIENT-LVL IV: CPT | Mod: PBBFAC,,, | Performed by: INTERNAL MEDICINE

## 2023-05-18 PROCEDURE — 1160F RVW MEDS BY RX/DR IN RCRD: CPT | Mod: CPTII,S$GLB,, | Performed by: INTERNAL MEDICINE

## 2023-05-18 PROCEDURE — 3078F PR MOST RECENT DIASTOLIC BLOOD PRESSURE < 80 MM HG: ICD-10-PCS | Mod: CPTII,S$GLB,, | Performed by: INTERNAL MEDICINE

## 2023-05-18 PROCEDURE — 3074F SYST BP LT 130 MM HG: CPT | Mod: CPTII,S$GLB,, | Performed by: INTERNAL MEDICINE

## 2023-05-18 PROCEDURE — 99205 PR OFFICE/OUTPT VISIT, NEW, LEVL V, 60-74 MIN: ICD-10-PCS | Mod: S$GLB,,, | Performed by: INTERNAL MEDICINE

## 2023-05-18 PROCEDURE — 4010F ACE/ARB THERAPY RXD/TAKEN: CPT | Mod: CPTII,S$GLB,, | Performed by: INTERNAL MEDICINE

## 2023-05-18 PROCEDURE — 99205 OFFICE O/P NEW HI 60 MIN: CPT | Mod: S$GLB,,, | Performed by: INTERNAL MEDICINE

## 2023-05-18 PROCEDURE — 3078F DIAST BP <80 MM HG: CPT | Mod: CPTII,S$GLB,, | Performed by: INTERNAL MEDICINE

## 2023-05-18 PROCEDURE — 1160F PR REVIEW ALL MEDS BY PRESCRIBER/CLIN PHARMACIST DOCUMENTED: ICD-10-PCS | Mod: CPTII,S$GLB,, | Performed by: INTERNAL MEDICINE

## 2023-05-18 PROCEDURE — 3074F PR MOST RECENT SYSTOLIC BLOOD PRESSURE < 130 MM HG: ICD-10-PCS | Mod: CPTII,S$GLB,, | Performed by: INTERNAL MEDICINE

## 2023-05-18 PROCEDURE — 1159F PR MEDICATION LIST DOCUMENTED IN MEDICAL RECORD: ICD-10-PCS | Mod: CPTII,S$GLB,, | Performed by: INTERNAL MEDICINE

## 2023-05-18 RX ORDER — SPIRONOLACTONE AND HYDROCHLOROTHIAZIDE 25; 25 MG/1; MG/1
1 TABLET ORAL DAILY
COMMUNITY

## 2023-05-18 RX ORDER — ALBUTEROL SULFATE 90 UG/1
AEROSOL, METERED RESPIRATORY (INHALATION)
COMMUNITY

## 2023-05-18 RX ORDER — ATORVASTATIN CALCIUM 10 MG/1
TABLET, FILM COATED ORAL
COMMUNITY
Start: 2023-04-24

## 2023-05-18 RX ORDER — MELOXICAM 15 MG/1
TABLET ORAL
COMMUNITY
Start: 2023-05-08 | End: 2023-10-12

## 2023-05-18 RX ORDER — DULOXETIN HYDROCHLORIDE 20 MG/1
20 CAPSULE, DELAYED RELEASE ORAL DAILY
Qty: 30 CAPSULE | Refills: 5 | Status: SHIPPED | OUTPATIENT
Start: 2023-05-18 | End: 2023-10-25

## 2023-05-18 RX ORDER — VALACYCLOVIR HYDROCHLORIDE 1 G/1
TABLET, FILM COATED ORAL
COMMUNITY

## 2023-05-18 ASSESSMENT — ROUTINE ASSESSMENT OF PATIENT INDEX DATA (RAPID3)
PATIENT GLOBAL ASSESSMENT SCORE: 7.5
PAIN SCORE: 5.5
PSYCHOLOGICAL DISTRESS SCORE: 3.3
MDHAQ FUNCTION SCORE: 1
FATIGUE SCORE: 2.2
TOTAL RAPID3 SCORE: 5.44

## 2023-05-18 NOTE — PROGRESS NOTES
Subjective:          Chief Complaint: Trice Damon is a 63 y.o. female who had concerns including Pain.    HPI:    Patient is a 63-year-old female she has been referred by orthopedic surgeon Dr. Carl Decker with a history of osteoarthritis she is status post left EVELIA in 2018.  She is had an L3-4 lumbar fusion with some DDD in lumbar and cervical spine.   +CTS no surgery as yet. Was sent to PT>    Patient does have history gastric bypass.  Past medical history includes hyperlipidemia hypertension AFib that rate controlled    Affected joints: knees, back, cervical spine, right foot > left foot.   Sleep apnea : + , but no CPAP now, did have in past.     Dr. Kendal Conde- pain management. Periodic cervical injections, management for lumbar injections. Has also addressed CTS (no injections as yet)  Periodic injections for her knees with Dr. Jeronimo Shen for foot and ankle arthritis and a fracture.     She has been recommended the typical osteoarthritis management:  NSAIDs: Celebrex (upset stomach), Mobic 15mg and taking does help with pain and movement. Ibuprofen (gi upset), Naproxen no recall.   Turmeric, Supplement: mostly using in food and notes better movement.     Allopurinol: given in past.   Lyrica: no recall.    Gabapentin: no benefit but used for back.   Cymbalta: no   Effexor: no  Opiates: only with surgeries, does not tolerate well longer than post op.     Average pain: best 5/10, can rise to 10/10, today 7/10.  Pain is worse with movement, better with rest.   Wandering pattern.   Imaging I am not able to review but I do have reports:  Right ankle and foot:  Three views nonweightbearing slight interval evidence of callus formation long minimally displaced oblique distal fibula fracture.  Great toe avulsion fracture was subtalar talonavicular degenerative changes.    Denies  symptoms of Raynaud's, fever, chills , dry mouth, dry eyes, dysphagia, tight  skin, signs of pleurisy ,  pericarditis, rashes/photosensitivity, oral ulcers,  chest pain, shortness of breath, GI complaints/IBD,  urinary complaints or hx of proteinura/nephritis,  alopecia , fatigue, photosensitivity, headache, change in vision, face & jaw pain, ,psoriasis, numbness, anxiety, abdomen pain, nausea, vomiting. No hx of DVT, PE, miscarriages where applicable.    REVIEW OF SYSTEMS:    Review of Systems   Constitutional:  Negative for fever, malaise/fatigue and weight loss.   HENT:  Negative for sore throat.    Eyes:  Negative for double vision, photophobia and redness.   Respiratory:  Negative for cough, shortness of breath and wheezing.    Cardiovascular:  Negative for chest pain, palpitations and orthopnea.   Gastrointestinal:  Negative for abdominal pain, constipation and diarrhea.   Genitourinary:  Negative for dysuria, hematuria and urgency.   Musculoskeletal:  Positive for back pain, joint pain, myalgias and neck pain.   Skin:  Negative for rash.   Neurological:  Negative for dizziness, tingling, focal weakness and headaches.   Endo/Heme/Allergies:  Does not bruise/bleed easily.   Psychiatric/Behavioral:  Negative for depression, hallucinations and suicidal ideas.              Objective:            Past Medical History:   Diagnosis Date    A-fib     Anemia     Anxiety     xanax PRN    Arrhythmia     a fib    Carpal tunnel syndrome     BUE, states in bilateral thighs as well    Depression     Encounter for blood transfusion     GERD (gastroesophageal reflux disease)     Gout     R foot    HTN (hypertension)     Hypothyroidism     IBS (irritable bowel syndrome)     Iron deficiency     Osteoarthritis     Sleep apnea     Stress incontinence      Family History   Problem Relation Age of Onset    Coronary artery disease Mother     Hypertension Mother     Pulmonary embolism Mother     Coronary artery disease Father      Social History     Tobacco Use    Smoking status: Former    Smokeless tobacco: Never    Tobacco comments:      QUIT WHEN 30YO   Substance Use Topics    Alcohol use: Yes     Alcohol/week: 4.0 standard drinks     Types: 4 Glasses of wine per week     Comment: wine once a week    Drug use: Not Currently     Frequency: 1.0 times per week         Current Outpatient Medications on File Prior to Visit   Medication Sig Dispense Refill    acetaminophen (TYLENOL) 325 MG tablet Take 650 mg by mouth daily as needed.       albuterol (PROVENTIL/VENTOLIN HFA) 90 mcg/actuation inhaler albuterol sulfate HFA 90 mcg/actuation aerosol inhaler   INHALE 2 PUFFS BY MOUTH EVERY 4 TO 6 HOURS AS NEEDED      atorvastatin (LIPITOR) 10 MG tablet Take by mouth.      citalopram (CELEXA) 40 MG tablet Take 40 mg by mouth every evening.       ferrous sulfate 325 mg (65 mg iron) Tab tablet Take 325 mg by mouth once daily.       L. ACIDOPHILUS/L. BIFIDUS (LACTOBACILLUS ACIDOPH & BIFID ORAL) Take 1 tablet by mouth 2 (two) times daily.      lisinopril (PRINIVIL,ZESTRIL) 20 MG tablet One tablet BID      magnesium oxide (MAG-OX) 400 mg tablet Take 500 mg by mouth once daily. 1-2 tablets Q AM.      meloxicam (MOBIC) 15 MG tablet Take by mouth.      metoprolol tartrate (LOPRESSOR) 25 MG tablet Take 12.5 mg by mouth 2 (two) times daily.       propafenone (RHTHYMOL) 150 MG Tab Take 300 mg by mouth 2 (two) times a day.       rabeprazole (ACIPHEX) 20 mg tablet Take 1 tablet (20 mg total) by mouth once daily. (Patient taking differently: Take 20 mg by mouth once daily. Takes daily) 90 tablet 2    spironolactone-hydrochlorothiazide 25-25mg (ALDACTAZIDE) 25-25 mg Tab Take 1 tablet by mouth once daily.      vitamin D (VITAMIN D3) 1000 units Tab Take 10,000 Units by mouth once daily.      allopurinol (ZYLOPRIM) 300 MG tablet Take 300 mg by mouth every evening.      ALPRAZolam (XANAX) 0.25 MG tablet Take 0.25 mg by mouth daily as needed.       CARDAMOM OIL, BULK, MISC by Misc.(Non-Drug; Combo Route) route every evening.      valACYclovir (VALTREX) 1000 MG tablet valacyclovir  1 gram tablet       Current Facility-Administered Medications on File Prior to Visit   Medication Dose Route Frequency Provider Last Rate Last Admin    HYDROmorphone injection 0.5 mg  0.5 mg Intravenous Q5 Min PRN Henrry Sneed MD   0.5 mg at 11/12/20 1109    lorazepam injection 0.5 mg  0.5 mg Intravenous Q5 Min PRN Henrry Sneed MD        ondansetron injection 4 mg  4 mg Intravenous Daily PRN Henrry Sneed MD   4 mg at 11/12/20 1104       Vitals:    05/18/23 0917   BP: 112/70   Pulse: (!) 50       Physical Exam:    Physical Exam          Assessment:       Encounter Diagnosis   Name Primary?    Primary osteoarthritis involving multiple joints Yes          Plan:        Primary osteoarthritis involving multiple joints  -     DULoxetine (CYMBALTA) 20 MG capsule; Take 1 capsule (20 mg total) by mouth once daily.  Dispense: 30 capsule; Refill: 5    Osteoarthritis:    Discussed a few options for further control of widespread OA pain.       There is no cure for OA, but medication, assistive devices and other therapies that dont involve drugs can help to ease pain. As a last resort, a damaged joint may be surgically fused or replaced with one made of a combination of metal, plastic and/or ceramic.     Medications    Pain and anti-inflammatory medicines for osteoarthritis are available as pills, syrups, patches, gels, creams or injectables. They include:      Analgesics. These are pain relievers and include acetaminophen and opioids. Acetaminophen is available over the counter (OTC); opioids must be prescribed by a doctor.  Nonsteroidal anti-inflammatory drugs (NSAIDs). These are the most commonly used drugs to ease inflammation and pain. They include aspirin, ibuprofen, naproxen and celecoxib, available either OTC or by prescription. The OTC versions help with pain but not inflammation.  Counterirritants. These OTC products contain ingredients like capsaicin, menthol and lidocaine that irritate nerve  endings, so the painful area feels cold, warm or itchy to take focus away from the actual pain.   Corticosteroids. These prescription anti-inflammatory medicines work in a similar way to a hormone called cortisol. The medicine is taken by mouth or injected into the joint at a doctors office.  Platelet-rich plasma (PRP). Available from a doctor by injection, this product is intended to help ease pain and inflammation. This is not approved by the Food & Drug Administration and evidence is still emerging, so discuss it with your doctor before trying it.  Other drugs. The antidepressant duloxetine (Cymbalta) and the anti-seizure drug pregabalin (Lyrica) are oral medicines that are FDA-approved to treat OA pain.    Nondrug Therapies    Exercise    Movement is an essential part of an OA treatment plan. Getting 150 minutes of moderate-to-vigorous exercise per week should be the goal, according to the U[place] Department of Health and Human Services. A good exercise program to fight OA pain and stiffness has four parts:    Strengthening exercises build muscles around painful joints and helps to ease the stress on them.   Range-of-motion exercise or stretching helps to reduce stiffness and keep joints moving.  Aerobic or cardio exercises help improve stamina and energy levels and reduce excess weight.  Balance exercises help strengthen small muscles around the knees and ankles and help prevent falls.    Excess weight puts additional force and stress on weight-bearing joints, including the hips, knees, ankles, feet and back, and fat cells promote inflammation. Losing extra weight helps reduce pain and slow joint damage. Every pound of weight lost removes four pounds of pressure on lower-body joints.    [email or web address]://[email or web address].org/[email or web address]  Follow up in about 4 months (around 9/18/2023).      60min consultation with greater than 50% of that time included Preparing to see the patient (review records, tests), Obtaining  and/or reviewing separately obtained historical data, Performing a medically appropriate examination and/or evaluation , Ordering medications, tests, and/or procedures, Referring and communicating with other healthcare professionals , Documenting clinical information in the electronic or other health record and Independently interpreting results  (as warranted) & communicating results to the patient/family/caregiver. All questions answered.    Thank you for allowing me to participate in the care of this very pleasant patient.

## 2023-05-18 NOTE — PATIENT INSTRUCTIONS
Continue Meloxicam 15mg daily  Use Tylenol 500mg as needed.   We are going to try Cymbalta at a low dose 20mg daily and see how you tolerate. I have to keep the dose low to not add to citalopram but it should be fine.

## 2023-07-05 NOTE — PROGRESS NOTES
Patient, Trice Damon (MRN #[id number]), presented with a recorded BMI of 53.18 kg/m^2 consistent with the definition of morbid obesity (ICD-10 E66.01). The patient's morbid obesity was monitored, evaluated, addressed and/or treated. This addendum to the medical record is made on 07/05/2023.

## 2023-09-19 ENCOUNTER — OFFICE VISIT (OUTPATIENT)
Dept: RHEUMATOLOGY | Facility: CLINIC | Age: 63
End: 2023-09-19
Payer: MEDICARE

## 2023-09-19 VITALS
HEART RATE: 56 BPM | SYSTOLIC BLOOD PRESSURE: 132 MMHG | BODY MASS INDEX: 47.09 KG/M2 | DIASTOLIC BLOOD PRESSURE: 81 MMHG | WEIGHT: 293 LBS | HEIGHT: 66 IN

## 2023-09-19 DIAGNOSIS — Z79.1 NSAID LONG-TERM USE: ICD-10-CM

## 2023-09-19 DIAGNOSIS — M15.9 PRIMARY OSTEOARTHRITIS INVOLVING MULTIPLE JOINTS: Primary | ICD-10-CM

## 2023-09-19 DIAGNOSIS — Z98.84 GASTRIC BYPASS STATUS FOR OBESITY: ICD-10-CM

## 2023-09-19 DIAGNOSIS — E66.01 CLASS 3 OBESITY: ICD-10-CM

## 2023-09-19 PROCEDURE — 99214 PR OFFICE/OUTPT VISIT, EST, LEVL IV, 30-39 MIN: ICD-10-PCS | Mod: S$GLB,,, | Performed by: INTERNAL MEDICINE

## 2023-09-19 PROCEDURE — 3079F PR MOST RECENT DIASTOLIC BLOOD PRESSURE 80-89 MM HG: ICD-10-PCS | Mod: CPTII,S$GLB,, | Performed by: INTERNAL MEDICINE

## 2023-09-19 PROCEDURE — 99999 PR PBB SHADOW E&M-EST. PATIENT-LVL IV: ICD-10-PCS | Mod: PBBFAC,,, | Performed by: INTERNAL MEDICINE

## 2023-09-19 PROCEDURE — 1160F RVW MEDS BY RX/DR IN RCRD: CPT | Mod: CPTII,S$GLB,, | Performed by: INTERNAL MEDICINE

## 2023-09-19 PROCEDURE — 99999 PR PBB SHADOW E&M-EST. PATIENT-LVL IV: CPT | Mod: PBBFAC,,, | Performed by: INTERNAL MEDICINE

## 2023-09-19 PROCEDURE — 3008F PR BODY MASS INDEX (BMI) DOCUMENTED: ICD-10-PCS | Mod: CPTII,S$GLB,, | Performed by: INTERNAL MEDICINE

## 2023-09-19 PROCEDURE — 3008F BODY MASS INDEX DOCD: CPT | Mod: CPTII,S$GLB,, | Performed by: INTERNAL MEDICINE

## 2023-09-19 PROCEDURE — 1159F MED LIST DOCD IN RCRD: CPT | Mod: CPTII,S$GLB,, | Performed by: INTERNAL MEDICINE

## 2023-09-19 PROCEDURE — 3075F PR MOST RECENT SYSTOLIC BLOOD PRESS GE 130-139MM HG: ICD-10-PCS | Mod: CPTII,S$GLB,, | Performed by: INTERNAL MEDICINE

## 2023-09-19 PROCEDURE — 1160F PR REVIEW ALL MEDS BY PRESCRIBER/CLIN PHARMACIST DOCUMENTED: ICD-10-PCS | Mod: CPTII,S$GLB,, | Performed by: INTERNAL MEDICINE

## 2023-09-19 PROCEDURE — 4010F PR ACE/ARB THEARPY RXD/TAKEN: ICD-10-PCS | Mod: CPTII,S$GLB,, | Performed by: INTERNAL MEDICINE

## 2023-09-19 PROCEDURE — 1159F PR MEDICATION LIST DOCUMENTED IN MEDICAL RECORD: ICD-10-PCS | Mod: CPTII,S$GLB,, | Performed by: INTERNAL MEDICINE

## 2023-09-19 PROCEDURE — 4010F ACE/ARB THERAPY RXD/TAKEN: CPT | Mod: CPTII,S$GLB,, | Performed by: INTERNAL MEDICINE

## 2023-09-19 PROCEDURE — 99214 OFFICE O/P EST MOD 30 MIN: CPT | Mod: S$GLB,,, | Performed by: INTERNAL MEDICINE

## 2023-09-19 PROCEDURE — 3075F SYST BP GE 130 - 139MM HG: CPT | Mod: CPTII,S$GLB,, | Performed by: INTERNAL MEDICINE

## 2023-09-19 PROCEDURE — 3079F DIAST BP 80-89 MM HG: CPT | Mod: CPTII,S$GLB,, | Performed by: INTERNAL MEDICINE

## 2023-09-19 RX ORDER — METOPROLOL SUCCINATE 25 MG/1
25 TABLET, EXTENDED RELEASE ORAL 2 TIMES DAILY
COMMUNITY
Start: 2023-07-06

## 2023-09-19 ASSESSMENT — ROUTINE ASSESSMENT OF PATIENT INDEX DATA (RAPID3)
PAIN SCORE: 7.5
PSYCHOLOGICAL DISTRESS SCORE: 3.3
PATIENT GLOBAL ASSESSMENT SCORE: 6
FATIGUE SCORE: 2.2
TOTAL RAPID3 SCORE: 5.61
MDHAQ FUNCTION SCORE: 1

## 2023-09-19 NOTE — PROGRESS NOTES
Subjective:          Chief Complaint: Trice Damon is a 63 y.o. female who had concerns including Disease Management.    HPI:  9/2023: patient doing well with Cymbalta 20mg no ASE we chose this dose due to current celexa - she notes some improved mobility/fluidity. No serotonin affects that she has noted-tremulous, tightness of muscles.   She has been on meloxicam through ortho since fall of 2022.     Rheum HPI:   Patient is a 63-year-old female she has been referred by orthopedic surgeon Dr. Carl Decker with a history of osteoarthritis she is status post left EVELIA in 2018.  She is had an L3-4 lumbar fusion with some DDD in lumbar and cervical spine.   +CTS no surgery as yet. Was sent to PT>    Patient does have history gastric bypass.  Past medical history includes hyperlipidemia hypertension AFib that rate controlled    Affected joints: knees, back, cervical spine, right foot > left foot.   Sleep apnea : + , but no CPAP now, did have in past.     Dr. Kendal Conde- pain management. Periodic cervical injections, management for lumbar injections. Has also addressed CTS (no injections as yet)  Periodic injections for her knees with Dr. Jeronimo Shen for foot and ankle arthritis and a fracture.     She has been recommended the typical osteoarthritis management:  NSAIDs: Celebrex (upset stomach), Mobic 15mg and taking does help with pain and movement. Ibuprofen (gi upset), Naproxen no recall.   Turmeric, Supplement: mostly using in food and notes better movement.     Allopurinol: given in past.   Lyrica: no recall.    Gabapentin: no benefit but used for back.   Cymbalta: no   Effexor: no  Opiates: only with surgeries, does not tolerate well longer than post op.     Average pain: best 5/10, can rise to 10/10, today 7/10.  Pain is worse with movement, better with rest.   Wandering pattern.   Imaging I am not able to review but I do have reports:  Right ankle and foot:  Three views nonweightbearing  slight interval evidence of callus formation long minimally displaced oblique distal fibula fracture.  Great toe avulsion fracture was subtalar talonavicular degenerative changes.    Denies  symptoms of Raynaud's, fever, chills , dry mouth, dry eyes, dysphagia, tight  skin, signs of pleurisy , pericarditis, rashes/photosensitivity, oral ulcers,  chest pain, shortness of breath, GI complaints/IBD,  urinary complaints or hx of proteinura/nephritis,  alopecia , fatigue, photosensitivity, headache, change in vision, face & jaw pain, ,psoriasis, numbness, anxiety, abdomen pain, nausea, vomiting. No hx of DVT, PE, miscarriages where applicable.    REVIEW OF SYSTEMS:    Review of Systems   Constitutional:  Negative for fever, malaise/fatigue and weight loss.   HENT:  Negative for sore throat.    Eyes:  Negative for double vision, photophobia and redness.   Respiratory:  Negative for cough, shortness of breath and wheezing.    Cardiovascular:  Negative for chest pain, palpitations and orthopnea.   Gastrointestinal:  Negative for abdominal pain, constipation and diarrhea.   Genitourinary:  Negative for dysuria, hematuria and urgency.   Musculoskeletal:  Positive for back pain, joint pain, myalgias and neck pain.   Skin:  Negative for rash.   Neurological:  Negative for dizziness, tingling, focal weakness and headaches.   Endo/Heme/Allergies:  Does not bruise/bleed easily.   Psychiatric/Behavioral:  Negative for depression, hallucinations and suicidal ideas.                Objective:            Past Medical History:   Diagnosis Date    A-fib     Anemia     Anxiety     xanax PRN    Arrhythmia     a fib    Carpal tunnel syndrome     BUE, states in bilateral thighs as well    Depression     Encounter for blood transfusion     GERD (gastroesophageal reflux disease)     Gout     R foot    HTN (hypertension)     Hypothyroidism     IBS (irritable bowel syndrome)     Iron deficiency     Osteoarthritis     Sleep apnea     Stress  incontinence      Family History   Problem Relation Age of Onset    Coronary artery disease Mother     Hypertension Mother     Pulmonary embolism Mother     Coronary artery disease Father      Social History     Tobacco Use    Smoking status: Former    Smokeless tobacco: Never    Tobacco comments:     QUIT WHEN 28YO   Substance Use Topics    Alcohol use: Yes     Alcohol/week: 4.0 standard drinks of alcohol     Types: 4 Glasses of wine per week     Comment: wine once a week    Drug use: Not Currently     Frequency: 1.0 times per week         Current Outpatient Medications on File Prior to Visit   Medication Sig Dispense Refill    acetaminophen (TYLENOL) 325 MG tablet Take 650 mg by mouth daily as needed.       albuterol (PROVENTIL/VENTOLIN HFA) 90 mcg/actuation inhaler albuterol sulfate HFA 90 mcg/actuation aerosol inhaler   INHALE 2 PUFFS BY MOUTH EVERY 4 TO 6 HOURS AS NEEDED      atorvastatin (LIPITOR) 10 MG tablet Take by mouth.      CARDAMOM OIL, BULK, MISC by Misc.(Non-Drug; Combo Route) route every evening.      citalopram (CELEXA) 40 MG tablet Take 40 mg by mouth every evening.       DULoxetine (CYMBALTA) 20 MG capsule Take 1 capsule (20 mg total) by mouth once daily. 30 capsule 5    ferrous sulfate 325 mg (65 mg iron) Tab tablet Take 325 mg by mouth once daily.       L. ACIDOPHILUS/L. BIFIDUS (LACTOBACILLUS ACIDOPH & BIFID ORAL) Take 1 tablet by mouth 2 (two) times daily.      lisinopril (PRINIVIL,ZESTRIL) 20 MG tablet One tablet BID      magnesium oxide (MAG-OX) 400 mg tablet Take 500 mg by mouth once daily. 1-2 tablets Q AM.      meloxicam (MOBIC) 15 MG tablet Take by mouth.      metoprolol succinate (TOPROL-XL) 25 MG 24 hr tablet Take 25 mg by mouth 2 (two) times daily.      metoprolol tartrate (LOPRESSOR) 25 MG tablet Take 12.5 mg by mouth 2 (two) times daily.       propafenone (RHTHYMOL) 150 MG Tab Take 300 mg by mouth 2 (two) times a day.       rabeprazole (ACIPHEX) 20 mg tablet Take 1 tablet (20 mg  total) by mouth once daily. (Patient taking differently: Take 20 mg by mouth once daily. Takes daily) 90 tablet 2    spironolactone-hydrochlorothiazide 25-25mg (ALDACTAZIDE) 25-25 mg Tab Take 1 tablet by mouth once daily.      valACYclovir (VALTREX) 1000 MG tablet valacyclovir 1 gram tablet      vitamin D (VITAMIN D3) 1000 units Tab Take 10,000 Units by mouth once daily.      allopurinol (ZYLOPRIM) 300 MG tablet Take 300 mg by mouth every evening.      ALPRAZolam (XANAX) 0.25 MG tablet Take 0.25 mg by mouth daily as needed.        Current Facility-Administered Medications on File Prior to Visit   Medication Dose Route Frequency Provider Last Rate Last Admin    HYDROmorphone injection 0.5 mg  0.5 mg Intravenous Q5 Min PRN Henrry Sneed MD   0.5 mg at 11/12/20 1109    lorazepam injection 0.5 mg  0.5 mg Intravenous Q5 Min PRN Henrry Sneed MD        ondansetron injection 4 mg  4 mg Intravenous Daily PRN Henrry Sneed MD   4 mg at 11/12/20 1104       Vitals:    09/19/23 1404   BP: 132/81   Pulse: (!) 56       Physical Exam:    Physical Exam  Constitutional:       Appearance: She is well-developed.   HENT:      Head: Normocephalic and atraumatic.   Eyes:      Pupils: Pupils are equal, round, and reactive to light.   Cardiovascular:      Rate and Rhythm: Normal rate and regular rhythm.      Heart sounds: Normal heart sounds.   Pulmonary:      Effort: Pulmonary effort is normal.      Breath sounds: Normal breath sounds.   Musculoskeletal:      Right shoulder: No swelling or tenderness. Normal range of motion.      Left shoulder: No swelling or tenderness. Normal range of motion.      Right elbow: No swelling. Normal range of motion. No tenderness.      Left elbow: No swelling. Normal range of motion. No tenderness.      Right wrist: No swelling or tenderness. Normal range of motion.      Left wrist: No swelling or tenderness. Normal range of motion.      Right hand: No swelling or tenderness. Normal  range of motion.      Left hand: No swelling or tenderness. Normal range of motion.      Cervical back: Normal range of motion.      Right knee: No swelling. Normal range of motion. No tenderness.      Left knee: No swelling. Normal range of motion. No tenderness.      Right foot: Normal range of motion. No swelling or tenderness.      Left foot: Normal range of motion. No swelling or tenderness.   Skin:     General: Skin is warm and dry.   Neurological:      Mental Status: She is alert and oriented to person, place, and time.   Psychiatric:         Behavior: Behavior normal.               Assessment:       Encounter Diagnoses   Name Primary?    Primary osteoarthritis involving multiple joints Yes    Class 3 obesity     Gastric bypass status for obesity     NSAID long-term use             Plan:        Primary osteoarthritis involving multiple joints  -     Basic Metabolic Panel; Future; Expected date: 09/19/2023    Class 3 obesity    Gastric bypass status for obesity  -     Basic Metabolic Panel; Future; Expected date: 09/19/2023    NSAID long-term use  -     Basic Metabolic Panel; Future; Expected date: 09/19/2023      Osteoarthritis:    Discussed a few options for further control of widespread OA pain.       There is no cure for OA, but medication, assistive devices and other therapies that dont involve drugs can help to ease pain. As a last resort, a damaged joint may be surgically fused or replaced with one made of a combination of metal, plastic and/or ceramic.     Medications:     Pain and anti-inflammatory medicines for osteoarthritis are available as pills, syrups, patches, gels, creams or injectables. They include:    Discussed I will fill Meloxicam but we must keep close eye on any reflux, abd pain or black stools.     Follow up in about 6 months (around 3/19/2024).      30min consultation with greater than 50% of that time included Preparing to see the patient (review records, tests), Obtaining and/or  reviewing separately obtained historical data, Performing a medically appropriate examination and/or evaluation , Ordering medications, tests, and/or procedures, Referring and communicating with other healthcare professionals , Documenting clinical information in the electronic or other health record and Independently interpreting results  (as warranted) & communicating results to the patient/family/caregiver. All questions answered.    Thank you for allowing me to participate in the care of this very pleasant patient.

## 2023-10-12 ENCOUNTER — TELEPHONE (OUTPATIENT)
Dept: RHEUMATOLOGY | Facility: CLINIC | Age: 63
End: 2023-10-12
Payer: MEDICARE

## 2023-10-12 RX ORDER — MELOXICAM 15 MG/1
15 TABLET ORAL DAILY PRN
Qty: 30 TABLET | Refills: 1 | Status: SHIPPED | OUTPATIENT
Start: 2023-10-12 | End: 2023-12-01

## 2023-10-12 NOTE — TELEPHONE ENCOUNTER
----- Message from Doreen Jacobo sent at 10/11/2023  3:09 PM CDT -----  Regarding: advise  Contact: Patient  Type: Needs Medical Advice  Who Called:  Patient  Symptoms (please be specific):    How long has patient had these symptoms:    Pharmacy name and phone #:    Best Call Back Number: [phone]    Additional Information: CLEM Wiggins, I have Trice Damon MRN:[id number] needs to discuss the visit she just had are you available?

## 2023-10-13 NOTE — TELEPHONE ENCOUNTER
Discussed  Dr Jones below advise with patient:    I am ok with Meloxicam 15mg daily x 1 week then stop for flares and keep a journal how much she is using. We have a lab for checking her kidney and I think one week should not irritate gastric bypass.      Dr. Scott     Patient voiced understanding and agreement.

## 2023-10-13 NOTE — TELEPHONE ENCOUNTER
I am ok with Meloxicam 15mg daily x 1 week then stop for flares and keep a journal how much she is using. We have a lab for checking her kidney and I think one week should not irritate gastric bypass.     Dr. Scott

## 2023-10-23 DIAGNOSIS — M15.9 PRIMARY OSTEOARTHRITIS INVOLVING MULTIPLE JOINTS: ICD-10-CM

## 2023-10-25 RX ORDER — DULOXETIN HYDROCHLORIDE 20 MG/1
20 CAPSULE, DELAYED RELEASE ORAL
Qty: 30 CAPSULE | Refills: 6 | Status: SHIPPED | OUTPATIENT
Start: 2023-10-25

## 2023-12-01 RX ORDER — MELOXICAM 15 MG/1
TABLET ORAL
Qty: 30 TABLET | Refills: 3 | Status: SHIPPED | OUTPATIENT
Start: 2023-12-01 | End: 2024-03-27

## 2024-03-27 RX ORDER — MELOXICAM 15 MG/1
TABLET ORAL
Qty: 30 TABLET | Refills: 0 | Status: SHIPPED | OUTPATIENT
Start: 2024-03-27 | End: 2024-04-24

## 2024-04-18 ENCOUNTER — TELEPHONE (OUTPATIENT)
Dept: RHEUMATOLOGY | Facility: CLINIC | Age: 64
End: 2024-04-18
Payer: MEDICARE

## 2024-04-18 NOTE — TELEPHONE ENCOUNTER
----- Message from Amisha Liang sent at 4/17/2024  4:13 PM CDT -----  Contact: self  Type: Needs Medical Advice  Who Called:  the patient     Pharmacy name and phone #:     [id number] - [place], [place] - [address]  [address]   [facility] [address]  Phone: [phone] Fax: [phone]         Best Call Back Number: [phone]  Additional Information: pt states pharmacy cannot fill her script but she has lost her meds while out of town and was wondering if it is ok to miss dosages of med DULoxetine (CYMBALTA) 20 MG capsule

## 2024-04-23 ENCOUNTER — LAB VISIT (OUTPATIENT)
Dept: LAB | Facility: HOSPITAL | Age: 64
End: 2024-04-23
Attending: INTERNAL MEDICINE
Payer: MEDICARE

## 2024-04-23 DIAGNOSIS — M15.9 PRIMARY OSTEOARTHRITIS INVOLVING MULTIPLE JOINTS: ICD-10-CM

## 2024-04-23 DIAGNOSIS — Z98.84 GASTRIC BYPASS STATUS FOR OBESITY: ICD-10-CM

## 2024-04-23 DIAGNOSIS — Z79.1 NSAID LONG-TERM USE: ICD-10-CM

## 2024-04-23 LAB
ANION GAP SERPL CALC-SCNC: 10 MMOL/L (ref 8–16)
BUN SERPL-MCNC: 42 MG/DL (ref 8–23)
CALCIUM SERPL-MCNC: 9.8 MG/DL (ref 8.7–10.5)
CHLORIDE SERPL-SCNC: 108 MMOL/L (ref 95–110)
CO2 SERPL-SCNC: 20 MMOL/L (ref 23–29)
CREAT SERPL-MCNC: 1.2 MG/DL (ref 0.5–1.4)
EST. GFR  (NO RACE VARIABLE): 50.9 ML/MIN/1.73 M^2
GLUCOSE SERPL-MCNC: 126 MG/DL (ref 70–110)
POTASSIUM SERPL-SCNC: 5.6 MMOL/L (ref 3.5–5.1)
SODIUM SERPL-SCNC: 138 MMOL/L (ref 136–145)

## 2024-04-23 PROCEDURE — 80048 BASIC METABOLIC PNL TOTAL CA: CPT | Performed by: INTERNAL MEDICINE

## 2024-04-23 PROCEDURE — 36415 COLL VENOUS BLD VENIPUNCTURE: CPT | Mod: PN | Performed by: INTERNAL MEDICINE

## 2024-04-24 RX ORDER — DULOXETIN HYDROCHLORIDE 20 MG/1
20 CAPSULE, DELAYED RELEASE ORAL DAILY
Qty: 90 CAPSULE | Refills: 0 | Status: SHIPPED | OUTPATIENT
Start: 2024-04-24 | End: 2024-05-13

## 2024-04-24 RX ORDER — MELOXICAM 15 MG/1
15 TABLET ORAL DAILY
Qty: 90 TABLET | Refills: 0 | Status: SHIPPED | OUTPATIENT
Start: 2024-04-24

## 2024-04-29 ENCOUNTER — TELEPHONE (OUTPATIENT)
Dept: RHEUMATOLOGY | Facility: CLINIC | Age: 64
End: 2024-04-29
Payer: MEDICARE

## 2024-04-29 NOTE — TELEPHONE ENCOUNTER
"Patient c/o rash to face since 4/24/25 states it is from a reaction from a knee injection that received at orthopedic office (Dr Decker) Within the last 2 weeks. She reported this to ortho which states they haven't seen a reaction after 6 days of injection. Pt went to Urgent Care 4/24/24 and they reviewed recent labwork and told pt she was dehydrated but did not treat her due to "they did not take my insurance"  Patient says that the bloodwork is ordered from Dr Scott due to upcoming appointment and she wants to see Dr Scott sooner. She has been rescheduled for tomorrow 4/30/24 but patient was informed and educated to the fact that if she feels she is having an allergic reaction to a shot or any other source she needs to be seen in the ER for assessment and treatment. Pt declined to go to ER.   Denies shortness of breath, Denies swelling. Pts lab results were abnormal and will be reviewed by provider.  Nurse again instructed pt to see ER as the danger of a reaction could be serious and rheumatology is not the ER. Pt voiced understanding.  Will route message to provider.   "

## 2024-04-29 NOTE — TELEPHONE ENCOUNTER
----- Message from Amisha Loredo sent at 4/29/2024 12:03 PM CDT -----  Regarding: Needs Medical Advice  Contact: patient at [phone]  Type: Needs Medical Advice  Who Called:  patient at [phone]    Additional Information: calling to see about getting more labs for an allergic reaction she is having to the medication or possible cellulitis of the face. Please call and advise. Thank you

## 2024-04-29 NOTE — TELEPHONE ENCOUNTER
----- Message from Kimberly Hsu sent at 4/29/2024  2:21 PM CDT -----  Pt said she needs to speak with someone urgently about her situation. She said she has developed a rash on the face and she isnt sure what it is. She sent a message in earlier and she is very dehydrated and doesn't know if she can wait till Thursday. Urgent care said she may need more blood work.  Please call back to advise, thank you.    Trice - [phone]

## 2024-04-29 NOTE — TELEPHONE ENCOUNTER
----- Message from Kimberly Hsu sent at 4/29/2024  2:21 PM CDT -----  Pt said she needs to speak with someone urgently about her situation. She said she has developed a rash on the face and she isnt sure what it is. She sent a message in earlier and she is very dehydrated and doesn't know if she can wait till Thursday. Urgent care said she may need more blood work.  Please call back to advise, thank you.    Trice - [phone]

## 2024-04-30 ENCOUNTER — OFFICE VISIT (OUTPATIENT)
Dept: RHEUMATOLOGY | Facility: CLINIC | Age: 64
End: 2024-04-30
Payer: MEDICARE

## 2024-04-30 VITALS
SYSTOLIC BLOOD PRESSURE: 113 MMHG | DIASTOLIC BLOOD PRESSURE: 74 MMHG | HEART RATE: 54 BPM | HEIGHT: 66 IN | WEIGHT: 293 LBS | BODY MASS INDEX: 47.09 KG/M2

## 2024-04-30 DIAGNOSIS — E87.5 HYPERKALEMIA: ICD-10-CM

## 2024-04-30 DIAGNOSIS — M15.9 PRIMARY OSTEOARTHRITIS INVOLVING MULTIPLE JOINTS: Primary | ICD-10-CM

## 2024-04-30 DIAGNOSIS — R21 FACIAL RASH: ICD-10-CM

## 2024-04-30 DIAGNOSIS — N28.9 RENAL INSUFFICIENCY: ICD-10-CM

## 2024-04-30 PROCEDURE — 3078F DIAST BP <80 MM HG: CPT | Mod: CPTII,S$GLB,, | Performed by: INTERNAL MEDICINE

## 2024-04-30 PROCEDURE — 4010F ACE/ARB THERAPY RXD/TAKEN: CPT | Mod: CPTII,S$GLB,, | Performed by: INTERNAL MEDICINE

## 2024-04-30 PROCEDURE — 99999 PR PBB SHADOW E&M-EST. PATIENT-LVL IV: CPT | Mod: PBBFAC,,, | Performed by: INTERNAL MEDICINE

## 2024-04-30 PROCEDURE — 99214 OFFICE O/P EST MOD 30 MIN: CPT | Mod: S$GLB,,, | Performed by: INTERNAL MEDICINE

## 2024-04-30 PROCEDURE — 3074F SYST BP LT 130 MM HG: CPT | Mod: CPTII,S$GLB,, | Performed by: INTERNAL MEDICINE

## 2024-04-30 PROCEDURE — 1159F MED LIST DOCD IN RCRD: CPT | Mod: CPTII,S$GLB,, | Performed by: INTERNAL MEDICINE

## 2024-04-30 PROCEDURE — 3008F BODY MASS INDEX DOCD: CPT | Mod: CPTII,S$GLB,, | Performed by: INTERNAL MEDICINE

## 2024-04-30 NOTE — PROGRESS NOTES
Subjective:          Chief Complaint: Trice Damon is a 64 y.o. female who had concerns including Disease Management.    HPI:  4/2024:   Filled Mobic last time 15mg daily, she dropped this in last 6-8 weeks to 3 times weekly. She is on spironolactone as of last year with rise in potassium, and decline in GFR. She has stopped spironolactone as of approx 4/26/23. She noted feeling weak when she obtained my labs as of 4/23/24. Rash started 4/24/24.   6 days after steroid injection for knee she noted facial rash that seems to be coming down now but unclear what happened here. Benedryl did not help with the rash at all. Involved cheeks and eyelids. Burning and itching no sob, no tongue swelling. Rash not located anywhere but face. No hx of rosacea.   She was seen with [facility]- but they were not able to accept her insurance.   She was scheduled for hand surgery with Dr. Joe.     9/2023: patient doing well with Cymbalta 20mg no ASE we chose this dose due to current celexa - she notes some improved mobility/fluidity. No serotonin affects that she has noted-tremulous, tightness of muscles.   She has been on meloxicam through ortho since fall of 2022.     Rheum HPI:   Patient is a 63-year-old female she has been referred by orthopedic surgeon Dr. Carl Decker with a history of osteoarthritis she is status post left EVELIA in 2018.  She is had an L3-4 lumbar fusion with some DDD in lumbar and cervical spine.   +CTS no surgery as yet. Was sent to PT>    Patient does have history gastric bypass.  Past medical history includes hyperlipidemia hypertension AFib that rate controlled    Affected joints: knees, back, cervical spine, right foot > left foot.   Sleep apnea : + , but no CPAP now, did have in past.     Dr. Kendal Conde- pain management. Periodic cervical injections, management for lumbar injections. Has also addressed CTS (no injections as yet)  Periodic injections for her knees with   Jeronimo Shen for foot and ankle arthritis and a fracture.     She has been recommended the typical osteoarthritis management:  NSAIDs: Celebrex (upset stomach), Mobic 15mg and taking does help with pain and movement. Ibuprofen (gi upset), Naproxen no recall.   Turmeric, Supplement: mostly using in food and notes better movement.     Allopurinol: given in past.   Lyrica: no recall.    Gabapentin: no benefit but used for back.   Cymbalta: no   Effexor: no  Opiates: only with surgeries, does not tolerate well longer than post op.     Average pain: best 5/10, can rise to 10/10, today 7/10.  Pain is worse with movement, better with rest.   Wandering pattern.   Imaging I am not able to review but I do have reports:  Right ankle and foot:  Three views nonweightbearing slight interval evidence of callus formation long minimally displaced oblique distal fibula fracture.  Great toe avulsion fracture was subtalar talonavicular degenerative changes.    Denies  symptoms of Raynaud's, fever, chills , dry mouth, dry eyes, dysphagia, tight  skin, signs of pleurisy , pericarditis, rashes/photosensitivity, oral ulcers,  chest pain, shortness of breath, GI complaints/IBD,  urinary complaints or hx of proteinura/nephritis,  alopecia , fatigue, photosensitivity, headache, change in vision, face & jaw pain, ,psoriasis, numbness, anxiety, abdomen pain, nausea, vomiting. No hx of DVT, PE, miscarriages where applicable.    REVIEW OF SYSTEMS:    Review of Systems   Constitutional:  Negative for fever, malaise/fatigue and weight loss.   HENT:  Negative for sore throat.    Eyes:  Negative for double vision, photophobia and redness.   Respiratory:  Negative for cough, shortness of breath and wheezing.    Cardiovascular:  Negative for chest pain, palpitations and orthopnea.   Gastrointestinal:  Negative for abdominal pain, constipation and diarrhea.   Genitourinary:  Negative for dysuria, hematuria and urgency.   Musculoskeletal:   Positive for back pain, joint pain, myalgias and neck pain.   Skin:  Negative for rash.   Neurological:  Negative for dizziness, tingling, focal weakness and headaches.   Endo/Heme/Allergies:  Does not bruise/bleed easily.   Psychiatric/Behavioral:  Negative for depression, hallucinations and suicidal ideas.                Objective:            Past Medical History:   Diagnosis Date    A-fib     Anemia     Anxiety     xanax PRN    Arrhythmia     a fib    Carpal tunnel syndrome     BUE, states in bilateral thighs as well    Depression     Encounter for blood transfusion     GERD (gastroesophageal reflux disease)     Gout     R foot    HTN (hypertension)     Hypothyroidism     IBS (irritable bowel syndrome)     Iron deficiency     Osteoarthritis     Sleep apnea     Stress incontinence      Family History   Problem Relation Name Age of Onset    Coronary artery disease Mother      Hypertension Mother      Pulmonary embolism Mother      Coronary artery disease Father       Social History     Tobacco Use    Smoking status: Former    Smokeless tobacco: Never    Tobacco comments:     QUIT WHEN 30YO   Substance Use Topics    Alcohol use: Yes     Alcohol/week: 4.0 standard drinks of alcohol     Types: 4 Glasses of wine per week     Comment: wine once a week    Drug use: Not Currently     Frequency: 1.0 times per week         Current Outpatient Medications on File Prior to Visit   Medication Sig Dispense Refill    acetaminophen (TYLENOL) 325 MG tablet Take 650 mg by mouth daily as needed.       albuterol (PROVENTIL/VENTOLIN HFA) 90 mcg/actuation inhaler albuterol sulfate HFA 90 mcg/actuation aerosol inhaler   INHALE 2 PUFFS BY MOUTH EVERY 4 TO 6 HOURS AS NEEDED      allopurinol (ZYLOPRIM) 300 MG tablet Take 300 mg by mouth every evening.      atorvastatin (LIPITOR) 10 MG tablet Take by mouth.      CARDAMOM OIL, BULK, MISC by Misc.(Non-Drug; Combo Route) route every evening.      citalopram (CELEXA) 40 MG tablet Take 40 mg by  mouth every evening.       DULoxetine (CYMBALTA) 20 MG capsule Take 1 capsule (20 mg total) by mouth once daily. 90 capsule 0    ferrous sulfate 325 mg (65 mg iron) Tab tablet Take 325 mg by mouth once daily.       L. ACIDOPHILUS/L. BIFIDUS (LACTOBACILLUS ACIDOPH & BIFID ORAL) Take 1 tablet by mouth 2 (two) times daily.      lisinopril (PRINIVIL,ZESTRIL) 20 MG tablet One tablet BID      magnesium oxide (MAG-OX) 400 mg tablet Take 500 mg by mouth once daily. 1-2 tablets Q AM.      meloxicam (MOBIC) 15 MG tablet Take 1 tablet (15 mg total) by mouth once daily. 90 tablet 0    metoprolol succinate (TOPROL-XL) 25 MG 24 hr tablet Take 25 mg by mouth 2 (two) times daily.      metoprolol tartrate (LOPRESSOR) 25 MG tablet Take 12.5 mg by mouth 2 (two) times daily.       propafenone (RHTHYMOL) 150 MG Tab Take 300 mg by mouth 2 (two) times a day.       rabeprazole (ACIPHEX) 20 mg tablet Take 1 tablet (20 mg total) by mouth once daily. (Patient taking differently: Take 20 mg by mouth once daily. Takes daily) 90 tablet 2    spironolactone-hydrochlorothiazide 25-25mg (ALDACTAZIDE) 25-25 mg Tab Take 1 tablet by mouth once daily.      valACYclovir (VALTREX) 1000 MG tablet valacyclovir 1 gram tablet      vitamin D (VITAMIN D3) 1000 units Tab Take 10,000 Units by mouth once daily.      ALPRAZolam (XANAX) 0.25 MG tablet Take 0.25 mg by mouth daily as needed.        Current Facility-Administered Medications on File Prior to Visit   Medication Dose Route Frequency Provider Last Rate Last Admin    HYDROmorphone injection 0.5 mg  0.5 mg Intravenous Q5 Min PRN Henrry Sneed MD   0.5 mg at 11/12/20 1109    lorazepam injection 0.5 mg  0.5 mg Intravenous Q5 Min PRN Henrry Sneed MD        ondansetron injection 4 mg  4 mg Intravenous Daily PRN Henrry Sneed MD   4 mg at 11/12/20 1104       Vitals:    04/30/24 1606   BP: 113/74   Pulse: (!) 54       Physical Exam:    Physical Exam  Constitutional:       Appearance: She is  well-developed.   HENT:      Head: Normocephalic and atraumatic.   Eyes:      Pupils: Pupils are equal, round, and reactive to light.   Cardiovascular:      Rate and Rhythm: Normal rate and regular rhythm.      Heart sounds: Normal heart sounds.   Pulmonary:      Effort: Pulmonary effort is normal.      Breath sounds: Normal breath sounds.   Musculoskeletal:      Right shoulder: No swelling or tenderness. Normal range of motion.      Left shoulder: No swelling or tenderness. Normal range of motion.      Right elbow: No swelling. Normal range of motion. No tenderness.      Left elbow: No swelling. Normal range of motion. No tenderness.      Right wrist: No swelling or tenderness. Normal range of motion.      Left wrist: No swelling or tenderness. Normal range of motion.      Right hand: No swelling or tenderness. Normal range of motion.      Left hand: No swelling or tenderness. Normal range of motion.      Cervical back: Normal range of motion.      Right knee: No swelling. Normal range of motion. No tenderness.      Left knee: No swelling. Normal range of motion. No tenderness.      Right foot: Normal range of motion. No swelling or tenderness.      Left foot: Normal range of motion. No swelling or tenderness.   Skin:     General: Skin is warm and dry.   Neurological:      Mental Status: She is alert and oriented to person, place, and time.   Psychiatric:         Behavior: Behavior normal.               Assessment:       Encounter Diagnoses   Name Primary?    Primary osteoarthritis involving multiple joints Yes    Hyperkalemia     Renal insufficiency     Facial rash               Plan:        Primary osteoarthritis involving multiple joints    Hyperkalemia  -     Cancel: Basic Metabolic Panel; Future; Expected date: 04/30/2024  -     Basic Metabolic Panel; Future; Expected date: 04/30/2024    Renal insufficiency  -     Cancel: Basic Metabolic Panel; Future; Expected date: 04/30/2024  -     Basic Metabolic Panel;  Future; Expected date: 04/30/2024    Facial rash        Osteoarthritis:    Discussed a few options for further control of widespread OA pain.       1OA: There is no cure for OA, but medication, assistive devices and other therapies that dont involve drugs can help to ease pain. As a last resort, a damaged joint may be surgically fused or replaced with one made of a combination of metal, plastic and/or ceramic.         2,3 HOLD all Mobic.   HOLD spironolactone right now  [facility]. Checking renal function.     4 Cannot answer the facial rash but have recommend she obtain a PCP for this. Looks like it is coming down. No concern for anaphylaxis, allergy or infection. ?rosacea.       No follow-ups on file.      30min consultation with greater than 50% of that time included Preparing to see the patient (review records, tests), Obtaining and/or reviewing separately obtained historical data, Performing a medically appropriate examination and/or evaluation , Ordering medications, tests, and/or procedures, Referring and communicating with other healthcare professionals , Documenting clinical information in the electronic or other health record and Independently interpreting results  (as warranted) & communicating results to the patient/family/caregiver. All questions answered.    Thank you for allowing me to participate in the care of this very pleasant patient.

## 2024-04-30 NOTE — PATIENT INSTRUCTIONS
Check kidney function tomorrow  HOLD mobic.   Hold spironolactone until we see the lab then f/u with Dr. Ramos.   Hold all alcohol  Continue with fluids.

## 2024-05-01 ENCOUNTER — TELEPHONE (OUTPATIENT)
Dept: RHEUMATOLOGY | Facility: CLINIC | Age: 64
End: 2024-05-01
Payer: MEDICARE

## 2024-05-01 NOTE — TELEPHONE ENCOUNTER
----- Message from Jordana Ott sent at 5/1/2024  9:00 AM CDT -----  Contact: Pt [phone]  Type: Needs Medical Advice  Who Called:  Pt     Best Call Back Number: [phone]    Additional Information: Pt requesting to have her lab order sent to [facility] in [place] . Pt stated its the office located on [facility]. Pls call back and advise

## 2024-05-02 LAB
BUN SERPL-MCNC: 29 MG/DL (ref 7–25)
BUN/CREAT SERPL: 30 (CALC) (ref 6–22)
CALCIUM SERPL-MCNC: 8.9 MG/DL (ref 8.6–10.4)
CHLORIDE SERPL-SCNC: 105 MMOL/L (ref 98–110)
CO2 SERPL-SCNC: 20 MMOL/L (ref 20–32)
CREAT SERPL-MCNC: 0.98 MG/DL (ref 0.5–1.05)
EGFR: 65 ML/MIN/1.73M2
GLUCOSE SERPL-MCNC: 103 MG/DL (ref 65–99)
POTASSIUM SERPL-SCNC: 5.6 MMOL/L (ref 3.5–5.3)
SODIUM SERPL-SCNC: 136 MMOL/L (ref 135–146)

## 2024-05-09 ENCOUNTER — TELEPHONE (OUTPATIENT)
Dept: RHEUMATOLOGY | Facility: CLINIC | Age: 64
End: 2024-05-09
Payer: MEDICARE

## 2024-05-09 DIAGNOSIS — N28.9 RENAL INSUFFICIENCY: Primary | ICD-10-CM

## 2024-05-09 NOTE — TELEPHONE ENCOUNTER
----- Message from Melania Alatorre sent at 5/9/2024  1:40 PM CDT -----  Contact: Self  Type: Needs Medical Advice    Who Called:  Patient  What is this regarding?:  She has a question. Can she start back taking her Meloxicam?  Best Call Back Number:  [phone]  Additional Information:  Please call the patient back at the phone number listed above to advise. Thank you!

## 2024-05-09 NOTE — TELEPHONE ENCOUNTER
Left voicemail that the provider is out of office today and advised not to resume meloxicam until the provider has reviewed labs.

## 2024-05-12 DIAGNOSIS — M15.9 PRIMARY OSTEOARTHRITIS INVOLVING MULTIPLE JOINTS: ICD-10-CM

## 2024-05-13 RX ORDER — DULOXETIN HYDROCHLORIDE 20 MG/1
20 CAPSULE, DELAYED RELEASE ORAL
Qty: 30 CAPSULE | Refills: 3 | Status: SHIPPED | OUTPATIENT
Start: 2024-05-13

## 2024-05-13 NOTE — TELEPHONE ENCOUNTER
She can resume taking Meloxicam 15mg 3 times weekly and repeat creatinine in 8 weeks sending what I need in lab to [facility] for her.     Her potassium is still elevated will need to check with Dr. Ramos for this if she is back on Spironolactone.   Do not eat food high in potassium at this time can look this up on internet.     Dr. Scott

## 2024-07-05 DIAGNOSIS — M15.9 PRIMARY OSTEOARTHRITIS INVOLVING MULTIPLE JOINTS: ICD-10-CM

## 2024-07-08 RX ORDER — DULOXETIN HYDROCHLORIDE 20 MG/1
20 CAPSULE, DELAYED RELEASE ORAL
Qty: 90 CAPSULE | Refills: 3 | Status: SHIPPED | OUTPATIENT
Start: 2024-07-08

## 2024-07-08 RX ORDER — MELOXICAM 15 MG/1
15 TABLET ORAL
Qty: 90 TABLET | Refills: 3 | Status: SHIPPED | OUTPATIENT
Start: 2024-07-08